# Patient Record
Sex: MALE | Race: WHITE | Employment: FULL TIME | URBAN - METROPOLITAN AREA
[De-identification: names, ages, dates, MRNs, and addresses within clinical notes are randomized per-mention and may not be internally consistent; named-entity substitution may affect disease eponyms.]

---
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
## 2022-02-14 ENCOUNTER — OFFICE VISIT (OUTPATIENT)
Dept: FAMILY MEDICINE | Facility: CLINIC | Age: 22
End: 2022-02-14
Payer: COMMERCIAL

## 2022-02-14 VITALS
WEIGHT: 218 LBS | HEART RATE: 95 BPM | BODY MASS INDEX: 31.21 KG/M2 | SYSTOLIC BLOOD PRESSURE: 119 MMHG | TEMPERATURE: 97.6 F | DIASTOLIC BLOOD PRESSURE: 77 MMHG | OXYGEN SATURATION: 97 % | HEIGHT: 70 IN | RESPIRATION RATE: 14 BRPM

## 2022-02-14 DIAGNOSIS — Z00.00 HEALTH MAINTENANCE EXAMINATION: Primary | ICD-10-CM

## 2022-02-14 DIAGNOSIS — Z23 NEED FOR VACCINATION: ICD-10-CM

## 2022-02-14 DIAGNOSIS — F32.9 MAJOR DEPRESSIVE DISORDER WITH SINGLE EPISODE, REMISSION STATUS UNSPECIFIED: ICD-10-CM

## 2022-02-14 PROCEDURE — 99395 PREV VISIT EST AGE 18-39: CPT | Mod: 25 | Performed by: FAMILY MEDICINE

## 2022-02-14 PROCEDURE — 90651 9VHPV VACCINE 2/3 DOSE IM: CPT | Mod: SL | Performed by: FAMILY MEDICINE

## 2022-02-14 PROCEDURE — 90471 IMMUNIZATION ADMIN: CPT | Mod: SL | Performed by: FAMILY MEDICINE

## 2022-02-14 ASSESSMENT — ENCOUNTER SYMPTOMS
SHORTNESS OF BREATH: 0
ABDOMINAL PAIN: 0
SORE THROAT: 0
JOINT SWELLING: 0
PARESTHESIAS: 0
MYALGIAS: 1
WEAKNESS: 0
FREQUENCY: 0
PALPITATIONS: 0
HEARTBURN: 0
EYE PAIN: 0
ARTHRALGIAS: 1
NAUSEA: 0
DIZZINESS: 0
NERVOUS/ANXIOUS: 1
HEMATOCHEZIA: 0
DYSURIA: 0
COUGH: 0
DIARRHEA: 0
HEADACHES: 1
FEVER: 0
CONSTIPATION: 0
CHILLS: 0
HEMATURIA: 0

## 2022-02-14 ASSESSMENT — MIFFLIN-ST. JEOR: SCORE: 2010.09

## 2022-02-14 ASSESSMENT — PAIN SCALES - GENERAL: PAINLEVEL: NO PAIN (0)

## 2022-02-14 ASSESSMENT — PATIENT HEALTH QUESTIONNAIRE - PHQ9
SUM OF ALL RESPONSES TO PHQ QUESTIONS 1-9: 21
10. IF YOU CHECKED OFF ANY PROBLEMS, HOW DIFFICULT HAVE THESE PROBLEMS MADE IT FOR YOU TO DO YOUR WORK, TAKE CARE OF THINGS AT HOME, OR GET ALONG WITH OTHER PEOPLE: VERY DIFFICULT
SUM OF ALL RESPONSES TO PHQ QUESTIONS 1-9: 21

## 2022-02-14 NOTE — PROGRESS NOTES
SUBJECTIVE:   CC: Dionicio Hale is an 21 year old male who presents for preventative health visit.       Patient has been advised of split billing requirements and indicates understanding: Yes  Healthy Habits:     Getting at least 3 servings of Calcium per day:  Yes    Bi-annual eye exam:  NO    Dental care twice a year:  Yes    Sleep apnea or symptoms of sleep apnea:  None    Diet:  Regular (no restrictions)    Frequency of exercise:  2-3 days/week    Duration of exercise:  Greater than 60 minutes    Taking medications regularly:  Yes    Medication side effects:  None    PHQ-2 Total Score: 6    Additional concerns today:  No    Patient requesting letter to return to work; out of work for 1 week plus today. Work wont let him return without the note.       Today's PHQ-2 Score:   PHQ-2 ( 1999 Pfizer) 2/14/2022   Q1: Little interest or pleasure in doing things 3   Q2: Feeling down, depressed or hopeless 3   PHQ-2 Score 6   PHQ-2 Total Score (12-17 Years)- Positive if 3 or more points; Administer PHQ-A if positive -   Q1: Little interest or pleasure in doing things Nearly every day   Q2: Feeling down, depressed or hopeless Nearly every day   PHQ-2 Score 6       Abuse: Current or Past(Physical, Sexual or Emotional)- No  Do you feel safe in your environment? Yes    Have you ever done Advance Care Planning? (For example, a Health Directive, POLST, or a discussion with a medical provider or your loved ones about your wishes): No, advance care planning information given to patient to review.  Patient declined advance care planning discussion at this time.    Social History     Tobacco Use     Smoking status: Current Some Day Smoker     Types: Other     Smokeless tobacco: Never Used     Tobacco comment: States not discussing at 3/25/21 visit   Substance Use Topics     Alcohol use: No     Comment: States not discussing at 3/25/21 visit     If you drink alcohol do you typically have >3 drinks per day or >7 drinks per week?  No    Alcohol Use 2/14/2022   Prescreen: >3 drinks/day or >7 drinks/week? No   Prescreen: >3 drinks/day or >7 drinks/week? -   No flowsheet data found.    Last PSA: No results found for: PSA    Reviewed orders with patient. Reviewed health maintenance and updated orders accordingly - Yes  BP Readings from Last 3 Encounters:   02/14/22 119/77   03/25/21 114/80   01/26/21 120/80    Wt Readings from Last 3 Encounters:   02/14/22 98.9 kg (218 lb) (97 %, Z= 1.85)*   03/25/21 107.5 kg (237 lb) (99 %, Z= 2.25)*   01/26/21 106 kg (233 lb 9.6 oz) (99 %, Z= 2.20)*     * Growth percentiles are based on [facility] (Boys, 2-20 Years) data.                  Patient Active Problem List   Diagnosis     Appendicitis     KEY (generalized anxiety disorder)     Major depression, single episode     Past Surgical History:   Procedure Laterality Date     LAPAROSCOPIC APPENDECTOMY CHILD  9/5/2012    Procedure: LAPAROSCOPIC APPENDECTOMY CHILD;  Laparoscopic appendectomy, possible open appendectomy;  Surgeon: Abimael Mcbride MD;  Location:  OR       Social History     Tobacco Use     Smoking status: Current Some Day Smoker     Types: Other     Smokeless tobacco: Never Used     Tobacco comment: States not discussing at 3/25/21 visit   Substance Use Topics     Alcohol use: No     Comment: States not discussing at 3/25/21 visit     Family History   Problem Relation Age of Onset     Hypertension Father          No current outpatient medications on file.     No Known Allergies  Recent Labs   Lab Test 01/26/21  1512   ALT 65*   CR 0.74   GFRESTIMATED >90   GFRESTBLACK >90   POTASSIUM 4.4   TSH 2.19        Reviewed and updated as needed this visit by clinical staff                Reviewed and updated as needed this visit by Provider               Past Medical History:   Diagnosis Date     NO ACTIVE PROBLEMS       Past Surgical History:   Procedure Laterality Date     LAPAROSCOPIC APPENDECTOMY CHILD  9/5/2012    Procedure: LAPAROSCOPIC APPENDECTOMY  "CHILD;  Laparoscopic appendectomy, possible open appendectomy;  Surgeon: Abimael Mcbride MD;  Location: [facility] OR       Review of Systems   Constitutional: Negative for chills and fever.   HENT: Negative for congestion, ear pain, hearing loss and sore throat.    Eyes: Negative for pain and visual disturbance.   Respiratory: Negative for cough and shortness of breath.    Cardiovascular: Negative for chest pain, palpitations and peripheral edema.   Gastrointestinal: Negative for abdominal pain, constipation, diarrhea, heartburn, hematochezia and nausea.   Genitourinary: Negative for dysuria, frequency, genital sores, hematuria, impotence, penile discharge and urgency.   Musculoskeletal: Positive for arthralgias and myalgias. Negative for joint swelling.   Skin: Negative for rash.   Neurological: Positive for headaches. Negative for dizziness, weakness and paresthesias.   Psychiatric/Behavioral: Positive for mood changes. The patient is nervous/anxious.          OBJECTIVE:   /77   Pulse 95   Temp 97.6  F (36.4  C) (Tympanic)   Resp 14   Ht 1.778 m (5' 10\")   Wt 98.9 kg (218 lb)   SpO2 97%   BMI 31.28 kg/m      Physical Exam  GENERAL: healthy, alert and no distress  EYES: Eyes grossly normal to inspection, PERRL and conjunctivae and sclerae normal  HENT: ear canals and TM's normal, nose and mouth without ulcers or lesions  NECK: no adenopathy, no asymmetry, masses, or scars and thyroid normal to palpation  RESP: lungs clear to auscultation - no rales, rhonchi or wheezes  CV: regular rate and rhythm, normal S1 S2, no S3 or S4, no murmur, click or rub, no peripheral edema and peripheral pulses strong  ABDOMEN: soft, nontender, no hepatosplenomegaly, no masses and bowel sounds normal  MS: no gross musculoskeletal defects noted, no edema  SKIN: no suspicious lesions or rashes  NEURO: Normal strength and tone, mentation intact and speech normal        ASSESSMENT/PLAN:       ICD-10-CM    1. Health maintenance " "examination  Z00.00 REVIEW OF HEALTH MAINTENANCE PROTOCOL ORDERS   2. Major depressive disorder with single episode, remission status unspecified  F32.9        Patient has been advised of split billing requirements and indicates understanding: Yes    COUNSELING:   Reviewed preventive health counseling, as reflected in patient instructions    Estimated body mass index is 35 kg/m  as calculated from the following:    Height as of 1/26/21: 1.753 m (5' 9\").    Weight as of 3/25/21: 107.5 kg (237 lb).     Weight management plan: Discussed healthy diet and exercise guidelines    He reports that he has been smoking other. He has never used smokeless tobacco.  Tobacco Cessation Action Plan:   Information offered: Patient not interested at this time      Counseling Resources:  ATP IV Guidelines  Pooled Cohorts Equation Calculator  FRAX Risk Assessment  ICSI Preventive Guidelines  Dietary Guidelines for Americans, 2010  USDA's MyPlate  ASA Prophylaxis  Lung CA Screening    Nathaniel Esquivel MD  [facility] [place]  Answers for HPI/ROS submitted by the patient on 2/14/2022  If you checked off any problems, how difficult have these problems made it for you to do your work, take care of things at home, or get along with other people?: Very difficult  PHQ9 TOTAL SCORE: 21      "

## 2022-02-15 ENCOUNTER — TELEPHONE (OUTPATIENT)
Dept: FAMILY MEDICINE | Facility: CLINIC | Age: 22
End: 2022-02-15
Payer: COMMERCIAL

## 2022-02-15 ASSESSMENT — PATIENT HEALTH QUESTIONNAIRE - PHQ9: SUM OF ALL RESPONSES TO PHQ QUESTIONS 1-9: 21

## 2022-02-15 NOTE — TELEPHONE ENCOUNTER
Please check on with him what illness made him away from work, since when he started being off of work, and when he wants to return to work

## 2022-02-15 NOTE — LETTER
February 15, 2022      Dionicio Hale  [address] [facility] [place] [address]        To Whom It May Concern,        Dionicio was seen by us at the clinic. He missed work on 2/4, 2/7 through 2/11 and 2/14, then 2/15 and 2/16 due to his illness. Since he improved from his previous symptom, He can return to work without restriction.     Please feel free to contact us if you need additional assistance.      Sincerely,        Nathaniel Esquivel MD

## 2022-02-15 NOTE — TELEPHONE ENCOUNTER
Patient thinks that he had a cold or the flu. Test for COVID 19 was negative. Missed work 2 weeks ago Friday (2/4) and the Monday 2/7 through Friday 2/11. Also missed 2/14 and 2/15. Patient states that he was not that sick, but was not allowed to return to work without a note from his doctor. States that he was having difficulty scheduling at the clinic because we were so busy.     Letter pended. Please review and sign.     Patient states that he can pick letter up at clinic tomorrow.    Please contact the patient by phone when completed.  OK to leave detailed message on cell.   Maida Maynard RN

## 2022-02-15 NOTE — TELEPHONE ENCOUNTER
Reason for Call:  Other return to work note     Detailed comments: patient is calling because he states  was going to write a return to work note that he no longer sick. Please send note to [employer] or email.     Phone Number Patient can be reached at: Cell number on file:    Telephone Information:   Mobile [phone]       Best Time: any      Can we leave a detailed message on this number? YES    Call taken on 2/15/2022 at 12:48 PM by Moira Fraser

## 2022-11-20 ENCOUNTER — HEALTH MAINTENANCE LETTER (OUTPATIENT)
Age: 22
End: 2022-11-20

## 2023-04-15 ENCOUNTER — HEALTH MAINTENANCE LETTER (OUTPATIENT)
Age: 23
End: 2023-04-15

## 2024-06-22 ENCOUNTER — HEALTH MAINTENANCE LETTER (OUTPATIENT)
Age: 24
End: 2024-06-22